# Patient Record
Sex: MALE | Race: WHITE | NOT HISPANIC OR LATINO | Employment: OTHER | URBAN - NONMETROPOLITAN AREA
[De-identification: names, ages, dates, MRNs, and addresses within clinical notes are randomized per-mention and may not be internally consistent; named-entity substitution may affect disease eponyms.]

---

## 2022-05-30 DIAGNOSIS — M79.639 FOREARM PAIN: Primary | ICD-10-CM

## 2023-02-03 ENCOUNTER — LAB REQUISITION (OUTPATIENT)
Dept: LAB | Facility: HOSPITAL | Age: 88
End: 2023-02-03
Payer: MEDICARE

## 2023-02-03 DIAGNOSIS — N39.0 URINARY TRACT INFECTION, SITE NOT SPECIFIED: ICD-10-CM

## 2023-02-03 LAB
APPEARANCE UR: CLEAR
BACTERIA #/AREA URNS AUTO: ABNORMAL /HPF
BILIRUB UR QL STRIP.AUTO: NEGATIVE MG/DL
COLOR UR AUTO: YELLOW
GLUCOSE UR QL STRIP.AUTO: NEGATIVE MG/DL
KETONES UR QL STRIP.AUTO: NEGATIVE MG/DL
LEUKOCYTE ESTERASE UR QL STRIP.AUTO: ABNORMAL UNIT/L
NITRITE UR QL STRIP.AUTO: POSITIVE
PH UR STRIP.AUTO: 5.5 [PH]
PROT UR QL STRIP.AUTO: NEGATIVE MG/DL
RBC #/AREA URNS AUTO: ABNORMAL /HPF
RBC UR QL AUTO: ABNORMAL UNIT/L
SP GR UR STRIP.AUTO: 1.02
SQUAMOUS #/AREA URNS AUTO: ABNORMAL /HPF
UROBILINOGEN UR STRIP-ACNC: 0.2 MG/DL
WBC #/AREA URNS AUTO: ABNORMAL /HPF

## 2023-02-03 PROCEDURE — 87186 SC STD MICRODIL/AGAR DIL: CPT | Performed by: INTERNAL MEDICINE

## 2023-02-03 PROCEDURE — 81003 URINALYSIS AUTO W/O SCOPE: CPT | Performed by: INTERNAL MEDICINE

## 2023-02-03 PROCEDURE — 87088 URINE BACTERIA CULTURE: CPT | Performed by: INTERNAL MEDICINE

## 2023-02-06 ENCOUNTER — LAB REQUISITION (OUTPATIENT)
Dept: LAB | Facility: HOSPITAL | Age: 88
End: 2023-02-06
Payer: MEDICARE

## 2023-02-06 DIAGNOSIS — N39.0 URINARY TRACT INFECTION, SITE NOT SPECIFIED: ICD-10-CM

## 2023-02-06 LAB
APPEARANCE UR: ABNORMAL
BACTERIA #/AREA URNS AUTO: ABNORMAL /HPF
BILIRUB UR QL STRIP.AUTO: NEGATIVE MG/DL
COLOR UR AUTO: YELLOW
GLUCOSE UR QL STRIP.AUTO: NEGATIVE MG/DL
KETONES UR QL STRIP.AUTO: NEGATIVE MG/DL
LEUKOCYTE ESTERASE UR QL STRIP.AUTO: ABNORMAL UNIT/L
NITRITE UR QL STRIP.AUTO: POSITIVE
PH UR STRIP.AUTO: 5.5 [PH]
PROT UR QL STRIP.AUTO: NEGATIVE MG/DL
RBC #/AREA URNS AUTO: ABNORMAL /HPF
RBC UR QL AUTO: ABNORMAL UNIT/L
SP GR UR STRIP.AUTO: >=1.03
SQUAMOUS #/AREA URNS AUTO: ABNORMAL /HPF
UROBILINOGEN UR STRIP-ACNC: 0.2 MG/DL
WBC #/AREA URNS AUTO: ABNORMAL /HPF

## 2023-02-06 PROCEDURE — 87077 CULTURE AEROBIC IDENTIFY: CPT | Performed by: INTERNAL MEDICINE

## 2023-02-06 PROCEDURE — 87088 URINE BACTERIA CULTURE: CPT | Performed by: INTERNAL MEDICINE

## 2023-02-06 PROCEDURE — 81001 URINALYSIS AUTO W/SCOPE: CPT | Performed by: INTERNAL MEDICINE

## 2023-02-08 LAB — BACTERIA UR CULT: ABNORMAL

## 2023-02-09 LAB
BACTERIA UR CULT: ABNORMAL
BACTERIA UR CULT: ABNORMAL

## 2023-05-23 ENCOUNTER — OUTSIDE PLACE OF SERVICE (OUTPATIENT)
Dept: ADMINISTRATIVE | Facility: OTHER | Age: 88
End: 2023-05-23
Payer: MEDICARE

## 2023-05-23 PROCEDURE — 99222 1ST HOSP IP/OBS MODERATE 55: CPT | Mod: ,,, | Performed by: FAMILY MEDICINE

## 2023-05-23 PROCEDURE — 99222 PR INITIAL HOSPITAL CARE,LEVL II: ICD-10-PCS | Mod: ,,, | Performed by: FAMILY MEDICINE

## 2023-06-06 ENCOUNTER — OUTSIDE PLACE OF SERVICE (OUTPATIENT)
Dept: ADMINISTRATIVE | Facility: OTHER | Age: 88
End: 2023-06-06
Payer: OTHER GOVERNMENT

## 2023-06-06 PROCEDURE — 99222 PR INITIAL HOSPITAL CARE,LEVL II: ICD-10-PCS | Mod: ,,, | Performed by: FAMILY MEDICINE

## 2023-06-06 PROCEDURE — 99222 1ST HOSP IP/OBS MODERATE 55: CPT | Mod: ,,, | Performed by: FAMILY MEDICINE

## 2023-07-05 ENCOUNTER — HOSPITAL ENCOUNTER (EMERGENCY)
Facility: HOSPITAL | Age: 88
Discharge: HOME | End: 2023-07-05
Payer: OTHER GOVERNMENT

## 2023-07-05 VITALS
HEIGHT: 68 IN | RESPIRATION RATE: 18 BRPM | BODY MASS INDEX: 21.37 KG/M2 | HEART RATE: 102 BPM | SYSTOLIC BLOOD PRESSURE: 140 MMHG | OXYGEN SATURATION: 100 % | DIASTOLIC BLOOD PRESSURE: 78 MMHG | TEMPERATURE: 99 F | WEIGHT: 141 LBS

## 2023-07-05 DIAGNOSIS — S01.81XA LACERATION OF FOREHEAD, INITIAL ENCOUNTER: Primary | ICD-10-CM

## 2023-07-05 PROCEDURE — 99284 EMERGENCY DEPT VISIT MOD MDM: CPT | Mod: 25

## 2023-07-05 PROCEDURE — 12002 RPR S/N/AX/GEN/TRNK2.6-7.5CM: CPT

## 2023-07-05 PROCEDURE — 25000003 PHARM REV CODE 250

## 2023-07-05 RX ORDER — LIDOCAINE HYDROCHLORIDE AND EPINEPHRINE 20; 10 MG/ML; UG/ML
1 INJECTION, SOLUTION INFILTRATION; PERINEURAL ONCE
Status: COMPLETED | OUTPATIENT
Start: 2023-07-05 | End: 2023-07-05

## 2023-07-05 RX ADMIN — LIDOCAINE HYDROCHLORIDE AND EPINEPHRINE 1 ML: 20; 10 INJECTION, SOLUTION INFILTRATION; PERINEURAL at 02:07

## 2023-07-05 NOTE — ED PROVIDER NOTES
Encounter Date: 7/5/2023       History     Chief Complaint   Patient presents with    Laceration     Onset tonight pt had a fall with laceration to forehead        87-year-old hospice [facility], [facility] after [facility], suffering laceration to his left upper forehead he is quite demented and has no complaints of his own.  There was no loss of consciousness.    The history is provided by the patient (Nursing home personnel).   Review of patient's allergies indicates:  No Known Allergies  Past Medical History:   Diagnosis Date    Dementia     Depression     GERD (gastroesophageal reflux disease)     Hypertension      No past surgical history on file.  No family history on file.     Review of Systems   Constitutional:  Negative for fever.   HENT:  Negative for sore throat.    Respiratory:  Negative for shortness of breath.    Cardiovascular:  Negative for chest pain.   Gastrointestinal:  Negative for nausea.   Genitourinary:  Negative for dysuria.   Musculoskeletal:  Negative for back pain.   Skin:  Positive for wound. Negative for rash.   Neurological:  Negative for weakness.   Hematological:  Does not bruise/bleed easily.   All other systems reviewed and are negative.    Physical Exam     Initial Vitals [07/05/23 0232]   BP Pulse Resp Temp SpO2   (!) 140/78 102 18 99.3 °F (37.4 °C) 100 %      MAP       --         Physical Exam    Nursing note and vitals reviewed.  Constitutional: Vital signs are normal. He appears well-developed and well-nourished. He is cooperative.   Elderly male in no apparent distress.  He is quite demented, and can not hold a conversation.   HENT:   Head: Normocephalic and atraumatic.   Eyes: Conjunctivae, EOM and lids are normal. Pupils are equal, round, and reactive to light.   Neck: Trachea normal. Neck supple.   Normal range of motion.  Cardiovascular:  Normal rate, regular rhythm, normal heart sounds and intact distal pulses.           Pulmonary/Chest: Breath sounds normal.    Abdominal: Abdomen is soft. Bowel sounds are normal.   Musculoskeletal:         General: Normal range of motion.      Cervical back: Normal, normal range of motion and neck supple.      Lumbar back: Normal.     Neurological: He is alert. He has normal strength. Coordination normal.   Skin: Skin is warm, dry and intact. Capillary refill takes less than 2 seconds.   There is a stellate 3 cm laceration to his left upper forehead.  There is some developing ecchymoses under his left eye.   Psychiatric: His speech is normal. Cognition and memory are normal.       ED Course   Lac Repair    Date/Time: 7/5/2023 2:30 AM  Performed by: Kole Uribe MD  Authorized by: Kole Uribe MD     Consent:     Consent obtained:  Emergent situation (Patient is in end-stage of dementia)  Universal protocol:     Immediately prior to procedure, a time out was called: yes      Patient identity confirmed:  Arm band  Anesthesia:     Anesthesia method:  Local infiltration    Local anesthetic:  Lidocaine 2% WITH epi  Laceration details:     Location:  Scalp    Scalp location:  Frontal    Length (cm):  3    Depth (mm):  2  Pre-procedure details:     Preparation:  Patient was prepped and draped in usual sterile fashion  Exploration:     Limited defect created (wound extended): no      Hemostasis achieved with:  Direct pressure    Imaging obtained comment:  CT brain    Wound extent: no foreign bodies/material noted, no muscle damage noted, no nerve damage noted, no underlying fracture noted and no vascular damage noted      Contaminated: no    Treatment:     Area cleansed with:  Chlorhexidine    Amount of cleaning:  Standard    Visualized foreign bodies/material removed: no      Debridement:  Minimal    Undermining:  None    Scar revision: no    Skin repair:     Repair method:  Sutures    Suture size:  4-0    Suture material:  Nylon    Suture technique:  Simple interrupted    Number of sutures:  5  Approximation:     Approximation:   Close  Repair type:     Repair type:  Simple  Post-procedure details:     Dressing: Sutured wound covered with Dermabond.    Procedure completion:  Tolerated well, no immediate complications  Labs Reviewed - No data to display       Imaging Results              CT Head Without Contrast (Final result)  Result time 07/05/23 03:28:26      Final result by Murray Mcdonough MD (07/05/23 03:28:26)                   Impression:        1. No acute intracranial abnormality.    2.  Chronic findings of atrophy and ischemia.    3.  Mild scalp swelling left frontal region.    n/a    Category: n/a    The following dose reduction techniques are used for all CT at [facility]:    1.   Automated exposure control.    2.   Adjustment of the mA and/or kV according to patient size.    3.   Use of iterative reconstruction technique.      Electronically signed by: Murray Mcdonough  Date:    07/05/2023  Time:    03:28               Narrative:      STUDY: HEAD CT WITHOUT CONTRAST    Demetrius Morocho, RT on 7/5/2023  3:04 AM    PROCEDURE: CT BRAIN WO CONT    CLINICAL HX: ER    PT FELL TODAY - LACERATION TO FOREHEAD    PMH: DEMENTIA, HTN    IV CONTRAST: NONE    ORAL CONTRAST: NONE    RECTAL CONTRAST: NONE    AXIAL IMAGING @ 5MM INTERVALS W/MULTIPLANAR RECONSTRUCTION OF IMAGES    TOTAL IMAGE NUMBER: [id number]    CTDIvol(mGy): HEAD: 52.3 BODY:    DLP(mGycm): HEAD: 1070.5 BODY:    # CT'S LAST 12 MONTHS:1    TECH: AJR    PT TRANSPORTED W/O INCIDENT    COMPARISON: 07/22/2021    FINDINGS:    Multiplanar imaging through the head/brain was performed.Mild thickening of the scalp is noted at the left frontal region.  Moderate generalized atrophy is present.  Mild decreased attenuation is noted involving the periventricular deep white matter on both sides compatible with chronic ischemic disease.  I see no intraparynchymal masses, hemorhagic lesions, or dominant wedge shaped infarcts. I see no extraxial masses or abnormal fluid collections.                                        Medications   LIDOcaine 2%/EPINEPHrine 1:100,000 injection 1 mL (1 mL Intradermal Given 7/5/23 0242)     Medical Decision Making:   Initial Assessment:   Elderly male with fall and scalp laceration/contusion  Differential Diagnosis:   Intracranial hemorrhage, skull fracture  ED Management:  Laceration repaired  CT brain ordered           ED Course as of 07/05/23 0335 Wed Jul 05, 2023   0334 CT Head Without Contrast  No acute intracranial findings [TM]      ED Course User Index  [TM] Kole Uribe MD                 Clinical Impression:   Final diagnoses:  [S01.81XA] Laceration of forehead, initial encounter (Primary)        ED Disposition Condition    Discharge Stable          ED Prescriptions    None       Follow-up Information       Follow up With Specialties Details Why Contact Info    Dalia Whitaker MD Internal Medicine  For suture removal in a week [address] [place] [id number] [phone]               Kole Uribe MD  07/05/23 0332